# Patient Record
Sex: FEMALE | Race: WHITE | NOT HISPANIC OR LATINO | URBAN - METROPOLITAN AREA
[De-identification: names, ages, dates, MRNs, and addresses within clinical notes are randomized per-mention and may not be internally consistent; named-entity substitution may affect disease eponyms.]

---

## 2017-05-25 ENCOUNTER — EMERGENCY (EMERGENCY)
Facility: HOSPITAL | Age: 42
LOS: 1 days | Discharge: HOME | End: 2017-05-25
Attending: EMERGENCY MEDICINE
Payer: COMMERCIAL

## 2017-05-25 VITALS
OXYGEN SATURATION: 100 % | DIASTOLIC BLOOD PRESSURE: 94 MMHG | RESPIRATION RATE: 18 BRPM | HEART RATE: 80 BPM | TEMPERATURE: 98 F | SYSTOLIC BLOOD PRESSURE: 142 MMHG

## 2017-05-25 VITALS
HEIGHT: 66 IN | HEART RATE: 91 BPM | DIASTOLIC BLOOD PRESSURE: 83 MMHG | SYSTOLIC BLOOD PRESSURE: 137 MMHG | WEIGHT: 270.07 LBS | TEMPERATURE: 98 F | OXYGEN SATURATION: 97 % | RESPIRATION RATE: 20 BRPM

## 2017-05-25 DIAGNOSIS — R10.32 LEFT LOWER QUADRANT PAIN: ICD-10-CM

## 2017-05-25 DIAGNOSIS — N83.202 UNSPECIFIED OVARIAN CYST, LEFT SIDE: ICD-10-CM

## 2017-05-25 DIAGNOSIS — Z88.0 ALLERGY STATUS TO PENICILLIN: ICD-10-CM

## 2017-05-25 DIAGNOSIS — Z90.49 ACQUIRED ABSENCE OF OTHER SPECIFIED PARTS OF DIGESTIVE TRACT: ICD-10-CM

## 2017-05-25 LAB
APPEARANCE UR: CLEAR — SIGNIFICANT CHANGE UP
BACTERIA # UR AUTO: ABNORMAL
BILIRUB UR-MCNC: NEGATIVE — SIGNIFICANT CHANGE UP
COLOR SPEC: YELLOW — SIGNIFICANT CHANGE UP
DIFF PNL FLD: NEGATIVE — SIGNIFICANT CHANGE UP
EPI CELLS # UR: ABNORMAL
GLUCOSE UR QL: NEGATIVE MG/DL — SIGNIFICANT CHANGE UP
HCG UR QL: NEGATIVE — SIGNIFICANT CHANGE UP
KETONES UR-MCNC: NEGATIVE — SIGNIFICANT CHANGE UP
LEUKOCYTE ESTERASE UR-ACNC: ABNORMAL
NITRITE UR-MCNC: NEGATIVE — SIGNIFICANT CHANGE UP
PH UR: 5 — SIGNIFICANT CHANGE UP (ref 5–8)
PROT UR-MCNC: NEGATIVE MG/DL — SIGNIFICANT CHANGE UP
RBC CASTS # UR COMP ASSIST: SIGNIFICANT CHANGE UP /HPF (ref 0–4)
SP GR SPEC: 1.02 — SIGNIFICANT CHANGE UP (ref 1.01–1.02)
UROBILINOGEN FLD QL: NEGATIVE MG/DL — SIGNIFICANT CHANGE UP
WBC UR QL: SIGNIFICANT CHANGE UP

## 2017-05-25 PROCEDURE — 76830 TRANSVAGINAL US NON-OB: CPT

## 2017-05-25 PROCEDURE — 81001 URINALYSIS AUTO W/SCOPE: CPT

## 2017-05-25 PROCEDURE — 99284 EMERGENCY DEPT VISIT MOD MDM: CPT | Mod: 25

## 2017-05-25 PROCEDURE — 76856 US EXAM PELVIC COMPLETE: CPT

## 2017-05-25 PROCEDURE — 87086 URINE CULTURE/COLONY COUNT: CPT

## 2017-05-25 PROCEDURE — 76856 US EXAM PELVIC COMPLETE: CPT | Mod: 26

## 2017-05-25 PROCEDURE — 81025 URINE PREGNANCY TEST: CPT

## 2017-05-25 PROCEDURE — 99284 EMERGENCY DEPT VISIT MOD MDM: CPT

## 2017-05-25 PROCEDURE — 76830 TRANSVAGINAL US NON-OB: CPT | Mod: 26

## 2017-05-25 RX ORDER — ACETAMINOPHEN 500 MG
975 TABLET ORAL ONCE
Qty: 0 | Refills: 0 | Status: COMPLETED | OUTPATIENT
Start: 2017-05-25 | End: 2017-05-25

## 2017-05-25 NOTE — ED STATDOCS - OBJECTIVE STATEMENT
41 y/o F pt w/ no significant PMHx presents to the ED c/o L suprapubic pain, intermittent x2 months, now resolved. Pt states that her pain is burning and comes every 2 weeks. LMP: 3-4 weeks ago. Pt denies frequency, dysuria, malodorous urine, hematuria, vaginal discharge, bleeding, PO intolerance, N/V/D, fever, chills, or any other complaints. Pt's pain is relieved w/ rest. Pt is on OCP's.

## 2017-05-25 NOTE — ED ADULT NURSE NOTE - OBJECTIVE STATEMENT
Pt  stating she has had pain in her pelvic area for a "few" weeks, Pt states pain is intermittent and jumps from left to right side.  Pt denies pain with urination, hematuria.

## 2017-05-25 NOTE — ED STATDOCS - ATTENDING CONTRIBUTION TO CARE
I, Goran Diop, performed the initial face to face bedside interview with this patient regarding history of present illness, review of symptoms and relevant past medical, social and family history.  I completed an independent physical examination.  I was the initial provider who evaluated this patient. I have signed out the follow up of any pending tests (i.e. labs, radiological studies) to the ACP.  I have communicated the patient’s plan of care and disposition with the ACP.  The history, relevant review of systems, past medical and surgical history, medical decision making, and physical examination was documented by the scribe in my presence and I attest to the accuracy of the documentation.

## 2017-05-25 NOTE — ED STATDOCS - MEDICAL DECISION MAKING DETAILS
Pt w/ intermittent suprapubic/LLQ pain, resolved. No associated urinary sx. US to eval for cyst/torsion, urine for UTI/pregnancy, tx symptomatically, check and reassess. Pt w/ intermittent suprapubic/LLQ pain, resolved. No associated urinary sx. US to eval for cyst/torsion, urine for UTI/pregnancy, tx symptomatically, check results and reassess.

## 2017-05-25 NOTE — ED ADULT TRIAGE NOTE - CHIEF COMPLAINT QUOTE
Patient arrived to ED today with c/o abdominal pain for the past couple of months.  Patient states her gallbladder was removed recently.

## 2017-05-25 NOTE — ED STATDOCS - PROGRESS NOTE DETAILS
PA NOTE: Pt seen by intake physician and hpi/orders/plan reviewed and agreed with. PT presenting to ED with complaints of Intermittent Abdominal pain x2 month that she experienced this afternoon in both her LLQ and suprapubic region. Pt stated that it was a 8 out of 10 that randomly comes and goes and last for 20-30 min, it is made better by laying down, she describes the pain as burning that does not radiate. pt denies changes in appitite, N/V, changes in bowel movement,.   PE: GEN: Awake, alert,  NAD,   CARDIAC: Reg rate and rhythm, S1,S2, RRR  RESP: No distress noted. Lungs CTA bilaterally no wheeze, ronchi, rales. ABD: soft,  non-tender, no guarding. . NEURO: AOx3, no focal deficits   PLAN: Follow intake plan and reevaluate. PA NOTE: US showed adnexal mass pt instructed to follow up with her OB-GYN, UC sent to r/o UTI will be contacted with results. CT home

## 2017-05-25 NOTE — ED ADULT NURSE REASSESSMENT NOTE - NS ED NURSE REASSESS COMMENT FT1
recevied patient from off going Rn - at 11pm - patient awake and alert at this time. no signs of distress - urine in lab running for culture    upon discharge explained to enoch to follow up with MD/ obgyn at this time. follow up call in am by PA to given urine results.

## 2017-05-25 NOTE — ED STATDOCS - NS ED MD SCRIBE ATTENDING SCRIBE SECTIONS
VITAL SIGNS( Pullset)/PHYSICAL EXAM/HISTORY OF PRESENT ILLNESS/HIV/REVIEW OF SYSTEMS/PAST MEDICAL/SURGICAL/SOCIAL HISTORY/DISPOSITION

## 2017-05-25 NOTE — ED STATDOCS - ENMT, MLM
Nasal mucosa clear.  Mouth with pink, moist mucosa  Throat has no vesicles, no oropharyngeal exudates and uvula is midline.

## 2017-05-26 DIAGNOSIS — Z90.49 ACQUIRED ABSENCE OF OTHER SPECIFIED PARTS OF DIGESTIVE TRACT: Chronic | ICD-10-CM

## 2017-05-27 LAB
CULTURE RESULTS: SIGNIFICANT CHANGE UP
SPECIMEN SOURCE: SIGNIFICANT CHANGE UP

## 2022-12-27 ENCOUNTER — APPOINTMENT (OUTPATIENT)
Dept: ORTHOPEDIC SURGERY | Facility: CLINIC | Age: 47
End: 2022-12-27

## 2022-12-27 VITALS — HEIGHT: 66 IN | BODY MASS INDEX: 36.96 KG/M2 | WEIGHT: 230 LBS

## 2022-12-27 PROBLEM — Z00.00 ENCOUNTER FOR PREVENTIVE HEALTH EXAMINATION: Status: ACTIVE | Noted: 2022-12-27

## 2022-12-27 PROCEDURE — 99214 OFFICE O/P EST MOD 30 MIN: CPT

## 2022-12-27 PROCEDURE — 72110 X-RAY EXAM L-2 SPINE 4/>VWS: CPT

## 2022-12-27 RX ORDER — CYCLOBENZAPRINE HYDROCHLORIDE 5 MG/1
5 TABLET, FILM COATED ORAL 3 TIMES DAILY
Qty: 60 | Refills: 0 | Status: ACTIVE | COMMUNITY
Start: 2022-12-27 | End: 1900-01-01

## 2022-12-27 RX ORDER — METHYLPREDNISOLONE 4 MG/1
4 TABLET ORAL
Qty: 1 | Refills: 0 | Status: ACTIVE | COMMUNITY
Start: 2022-12-27 | End: 1900-01-01

## 2022-12-27 NOTE — ASSESSMENT
[FreeTextEntry1] : 47 F with LBP and muscle spasm\par MDP\par PT\par Muscle relaxants\par FU 6 weeks\par IF pain persists MRI \par No NSAIDS due to gastric sleeve

## 2022-12-27 NOTE — HISTORY OF PRESENT ILLNESS
[de-identified] : The patient is a 47F RHD who presents today complaining of lower back pain.\par Date of Injury/Onset: 5+ years\par Pain:    At Rest:   1/10  \par With Activity:    8-9/10  \par Mechanism of injury: No specific cause of injury or trauma \par Quality of symptoms:  Aching/ Sharp\par Improves with: Massage\par Worse with: Bending \par Prior treatment: Chiropractor \par Prior Imaging: None\par School/Sport/Position/Occupation: \par Additional Information: None \par \par \par 47F with 5 years of intermittent low back pain that has been worsening. Has been treated intermittently with chiropractic care which improved pain.  Episodes have been becoming more frequent and longer.  THis exacerbation has been going on since 16th.  Tried massage gun, aleve, naproxen with mild relief.  No numbness, weakness, or tingling legs./ Pain is electric like/spams Recent episode began after sleeping on too soft of a bed during vacation\par \par PMH: psoriasis, psoriatic arthritis \par PSH: Gastric Sleeve, Cholecystectomy

## 2022-12-27 NOTE — IMAGING
[de-identified] : Spine:\par Inspection/Palpation:\par No tenderness to palpation throughout Cervical/thoracic/lumbar spine.\par No bony stepoffs, No lesions.\par \par Gait:\par Non-antalgic, able to perform bilateral toe and heel rise.  Able to perform tandem gait.  \par \par Range of Motion:\par Lumbar Spine: Flexion to 90 degrees, Extension to 30 degrees, rotation 30 degrees bilaterally, lateral flexion to 30 degrees bilaterally.\par \par Neurologic:\par \par Bilateral Lower Extremities 5/5 Iliopsoas/Quadriceps/Hamstrings/ Tibialis Anterior/ Gastrocnemius. Extensor Hallucis Longus/ Flexor Hallucis Longus except \par \par \par Sensation intact to light touch L2-S1\par \par Patellar/ Achilles reflex within normal limits.\par \par \par Negative straight leg raise\par \par No pain with IR/ER/Flexion of HIps bilaterally \par X-ray Ap/Lateral/Flexion/Extension of lumbar spine from today were viewed and interpreted.  mild coronal curvature.  likely secondary muscle.

## 2023-02-07 ENCOUNTER — APPOINTMENT (OUTPATIENT)
Dept: ORTHOPEDIC SURGERY | Facility: CLINIC | Age: 48
End: 2023-02-07
Payer: COMMERCIAL

## 2023-02-07 DIAGNOSIS — M54.50 LOW BACK PAIN, UNSPECIFIED: ICD-10-CM

## 2023-02-07 DIAGNOSIS — G89.29 LOW BACK PAIN, UNSPECIFIED: ICD-10-CM

## 2023-02-07 PROCEDURE — 99213 OFFICE O/P EST LOW 20 MIN: CPT

## 2023-02-07 NOTE — ASSESSMENT
[FreeTextEntry1] : 47 F with LBP and muscle spasm that has marginally improved but not completely resolved.\par \par C/w PT\par No NSAIDS due to gastric sleeve\par FU on an as needed basis

## 2023-02-07 NOTE — IMAGING
[de-identified] : Spine:\par Inspection/Palpation:\par No tenderness to palpation throughout Cervical/thoracic/lumbar spine.\par No bony stepoffs, No lesions.\par \par Gait:\par Non-antalgic, able to perform bilateral toe and heel rise.  Able to perform tandem gait.  \par \par Range of Motion:\par Lumbar Spine: Flexion to 90 degrees, Extension to 30 degrees, rotation 30 degrees bilaterally, lateral flexion to 30 degrees bilaterally.\par \par Neurologic:\par \par Bilateral Lower Extremities 5/5 Iliopsoas/Quadriceps/Hamstrings/ Tibialis Anterior/ Gastrocnemius. Extensor Hallucis Longus/ Flexor Hallucis Longus except \par \par \par Sensation intact to light touch L2-S1\par \par Patellar/ Achilles reflex within normal limits.\par \par \par Negative straight leg raise\par \par No pain with IR/ER/Flexion of HIps bilaterally \par X-ray Ap/Lateral/Flexion/Extension of lumbar spine from today were viewed and interpreted.  mild coronal curvature.  likely secondary muscle.

## 2023-02-07 NOTE — HISTORY OF PRESENT ILLNESS
[3] : 3 [0] : 0 [de-identified] : 12/27/22 The patient is a 47F RHD who presents today complaining of lower back pain.\par Date of Injury/Onset: 5+ years\par Pain:    At Rest:   1/10  \par With Activity:    8-9/10  \par Mechanism of injury: No specific cause of injury or trauma \par Quality of symptoms:  Aching/ Sharp\par Improves with: Massage\par Worse with: Bending \par Prior treatment: Chiropractor \par Prior Imaging: None\par School/Sport/Position/Occupation: \par Additional Information: None \par \par \par 47F with 5 years of intermittent low back pain that has been worsening. Has been treated intermittently with chiropractic care which improved pain.  Episodes have been becoming more frequent and longer.  THis exacerbation has been going on since 16th.  Tried massage gun, aleve, naproxen with mild relief.  No numbness, weakness, or tingling legs./ Pain is electric like/spams Recent episode began after sleeping on too soft of a bed during vacation\par \par PMH: psoriasis, psoriatic arthritis \par PSH: Gastric Sleeve, Cholecystectomy  \par \par 2/7/23  Here today fro followup.  Back pain is improved but not completely gone.  Has been going to PT.  Feel its has benefitted her.   [] : no [FreeTextEntry1] : L-spine  [de-identified] : PT- only has gone 2x but feels as it will help if insurance approved more